# Patient Record
Sex: MALE | Race: WHITE | NOT HISPANIC OR LATINO | Employment: FULL TIME | URBAN - METROPOLITAN AREA
[De-identification: names, ages, dates, MRNs, and addresses within clinical notes are randomized per-mention and may not be internally consistent; named-entity substitution may affect disease eponyms.]

---

## 2022-07-21 ENCOUNTER — HOSPITAL ENCOUNTER (OUTPATIENT)
Facility: HOSPITAL | Age: 42
Discharge: HOME | End: 2022-07-22
Attending: EMERGENCY MEDICINE | Admitting: INTERNAL MEDICINE
Payer: COMMERCIAL

## 2022-07-21 DIAGNOSIS — K35.80 ACUTE APPENDICITIS: ICD-10-CM

## 2022-07-21 DIAGNOSIS — K35.30 ACUTE APPENDICITIS WITH LOCALIZED PERITONITIS, WITHOUT PERFORATION, ABSCESS, OR GANGRENE: ICD-10-CM

## 2022-07-21 LAB
ALBUMIN SERPL BCP-MCNC: 4.9 G/DL (ref 3.5–5.2)
ALP SERPL-CCNC: 80 U/L (ref 55–135)
ALT SERPL W/O P-5'-P-CCNC: 88 U/L (ref 10–44)
ANION GAP SERPL CALC-SCNC: 11 MMOL/L (ref 8–16)
AST SERPL-CCNC: 45 U/L (ref 10–40)
BACTERIA #/AREA URNS HPF: NEGATIVE /HPF
BASOPHILS # BLD AUTO: 0.02 K/UL (ref 0–0.2)
BASOPHILS NFR BLD: 0.2 % (ref 0–1.9)
BILIRUB SERPL-MCNC: 1.8 MG/DL (ref 0.1–1)
BILIRUB UR QL STRIP: NEGATIVE
BUN SERPL-MCNC: 15 MG/DL (ref 6–20)
CALCIUM SERPL-MCNC: 9.7 MG/DL (ref 8.7–10.5)
CHLORIDE SERPL-SCNC: 101 MMOL/L (ref 95–110)
CLARITY UR: CLEAR
CO2 SERPL-SCNC: 26 MMOL/L (ref 23–29)
COLOR UR: YELLOW
CREAT SERPL-MCNC: 1.3 MG/DL (ref 0.5–1.4)
CREAT SERPL-MCNC: 1.3 MG/DL (ref 0.5–1.4)
DIFFERENTIAL METHOD: ABNORMAL
EOSINOPHIL # BLD AUTO: 0 K/UL (ref 0–0.5)
EOSINOPHIL NFR BLD: 0.1 % (ref 0–8)
ERYTHROCYTE [DISTWIDTH] IN BLOOD BY AUTOMATED COUNT: 12.3 % (ref 11.5–14.5)
EST. GFR  (AFRICAN AMERICAN): >60 ML/MIN/1.73 M^2
EST. GFR  (NON AFRICAN AMERICAN): >60 ML/MIN/1.73 M^2
GLUCOSE SERPL-MCNC: 112 MG/DL (ref 70–110)
GLUCOSE UR QL STRIP: NEGATIVE
HCT VFR BLD AUTO: 46.7 % (ref 40–54)
HGB BLD-MCNC: 16.3 G/DL (ref 14–18)
HGB UR QL STRIP: NEGATIVE
HYALINE CASTS #/AREA URNS LPF: 0 /LPF
IMM GRANULOCYTES # BLD AUTO: 0.03 K/UL (ref 0–0.04)
IMM GRANULOCYTES NFR BLD AUTO: 0.3 % (ref 0–0.5)
KETONES UR QL STRIP: ABNORMAL
LEUKOCYTE ESTERASE UR QL STRIP: NEGATIVE
LIPASE SERPL-CCNC: 28 U/L (ref 4–60)
LYMPHOCYTES # BLD AUTO: 0.3 K/UL (ref 1–4.8)
LYMPHOCYTES NFR BLD: 3.6 % (ref 18–48)
MCH RBC QN AUTO: 31.8 PG (ref 27–31)
MCHC RBC AUTO-ENTMCNC: 34.9 G/DL (ref 32–36)
MCV RBC AUTO: 91 FL (ref 82–98)
MICROSCOPIC COMMENT: NORMAL
MONOCYTES # BLD AUTO: 0.2 K/UL (ref 0.3–1)
MONOCYTES NFR BLD: 1.8 % (ref 4–15)
NEUTROPHILS # BLD AUTO: 8.9 K/UL (ref 1.8–7.7)
NEUTROPHILS NFR BLD: 94 % (ref 38–73)
NITRITE UR QL STRIP: NEGATIVE
NRBC BLD-RTO: 0 /100 WBC
PH UR STRIP: >8 [PH] (ref 5–8)
PLATELET # BLD AUTO: 177 K/UL (ref 150–450)
PMV BLD AUTO: 9.2 FL (ref 9.2–12.9)
POTASSIUM SERPL-SCNC: 3.2 MMOL/L (ref 3.5–5.1)
PROT SERPL-MCNC: 8 G/DL (ref 6–8.4)
PROT UR QL STRIP: ABNORMAL
RBC # BLD AUTO: 5.12 M/UL (ref 4.6–6.2)
RBC #/AREA URNS HPF: 0 /HPF (ref 0–4)
SAMPLE: NORMAL
SARS-COV-2 RDRP RESP QL NAA+PROBE: NEGATIVE
SODIUM SERPL-SCNC: 138 MMOL/L (ref 136–145)
SP GR UR STRIP: >1.03 (ref 1–1.03)
SQUAMOUS #/AREA URNS HPF: 1 /HPF
URN SPEC COLLECT METH UR: ABNORMAL
UROBILINOGEN UR STRIP-ACNC: NEGATIVE EU/DL
WBC # BLD AUTO: 9.42 K/UL (ref 3.9–12.7)
WBC #/AREA URNS HPF: 1 /HPF (ref 0–5)

## 2022-07-21 PROCEDURE — 99285 EMERGENCY DEPT VISIT HI MDM: CPT | Mod: 25,CS

## 2022-07-21 PROCEDURE — 63600175 PHARM REV CODE 636 W HCPCS: Performed by: EMERGENCY MEDICINE

## 2022-07-21 PROCEDURE — 25000003 PHARM REV CODE 250: Performed by: EMERGENCY MEDICINE

## 2022-07-21 PROCEDURE — 96361 HYDRATE IV INFUSION ADD-ON: CPT | Mod: 59

## 2022-07-21 PROCEDURE — 81001 URINALYSIS AUTO W/SCOPE: CPT | Performed by: EMERGENCY MEDICINE

## 2022-07-21 PROCEDURE — 25500020 PHARM REV CODE 255: Performed by: EMERGENCY MEDICINE

## 2022-07-21 PROCEDURE — U0002 COVID-19 LAB TEST NON-CDC: HCPCS | Performed by: EMERGENCY MEDICINE

## 2022-07-21 PROCEDURE — 96375 TX/PRO/DX INJ NEW DRUG ADDON: CPT

## 2022-07-21 PROCEDURE — G0378 HOSPITAL OBSERVATION PER HR: HCPCS

## 2022-07-21 PROCEDURE — G0378 HOSPITAL OBSERVATION PER HR: HCPCS | Mod: CS

## 2022-07-21 PROCEDURE — 25000003 PHARM REV CODE 250: Performed by: NURSE PRACTITIONER

## 2022-07-21 PROCEDURE — 80053 COMPREHEN METABOLIC PANEL: CPT | Performed by: EMERGENCY MEDICINE

## 2022-07-21 PROCEDURE — 83690 ASSAY OF LIPASE: CPT | Performed by: EMERGENCY MEDICINE

## 2022-07-21 PROCEDURE — 96365 THER/PROPH/DIAG IV INF INIT: CPT

## 2022-07-21 PROCEDURE — 85025 COMPLETE CBC W/AUTO DIFF WBC: CPT | Performed by: EMERGENCY MEDICINE

## 2022-07-21 RX ORDER — POTASSIUM CHLORIDE 20 MEQ/1
40 TABLET, EXTENDED RELEASE ORAL ONCE
Status: COMPLETED | OUTPATIENT
Start: 2022-07-21 | End: 2022-07-21

## 2022-07-21 RX ORDER — SODIUM CHLORIDE 9 MG/ML
1000 INJECTION, SOLUTION INTRAVENOUS
Status: COMPLETED | OUTPATIENT
Start: 2022-07-21 | End: 2022-07-22

## 2022-07-21 RX ORDER — ACETAMINOPHEN 325 MG/1
650 TABLET ORAL EVERY 4 HOURS PRN
Status: DISCONTINUED | OUTPATIENT
Start: 2022-07-21 | End: 2022-07-22 | Stop reason: HOSPADM

## 2022-07-21 RX ORDER — KETOROLAC TROMETHAMINE 30 MG/ML
15 INJECTION, SOLUTION INTRAMUSCULAR; INTRAVENOUS
Status: COMPLETED | OUTPATIENT
Start: 2022-07-21 | End: 2022-07-21

## 2022-07-21 RX ADMIN — POTASSIUM CHLORIDE 40 MEQ: 1500 TABLET, EXTENDED RELEASE ORAL at 10:07

## 2022-07-21 RX ADMIN — PIPERACILLIN SODIUM,TAZOBACTAM SODIUM 3.38 G: 3; .375 INJECTION, POWDER, FOR SOLUTION INTRAVENOUS at 09:07

## 2022-07-21 RX ADMIN — SODIUM CHLORIDE 1000 ML: 0.9 INJECTION, SOLUTION INTRAVENOUS at 08:07

## 2022-07-21 RX ADMIN — KETOROLAC TROMETHAMINE 15 MG: 30 INJECTION, SOLUTION INTRAMUSCULAR at 07:07

## 2022-07-21 RX ADMIN — SODIUM CHLORIDE 1000 ML: 0.9 INJECTION, SOLUTION INTRAVENOUS at 07:07

## 2022-07-21 RX ADMIN — ACETAMINOPHEN 650 MG: 325 TABLET ORAL at 10:07

## 2022-07-21 RX ADMIN — IOHEXOL 100 ML: 350 INJECTION, SOLUTION INTRAVENOUS at 08:07

## 2022-07-22 ENCOUNTER — ANESTHESIA (OUTPATIENT)
Dept: SURGERY | Facility: HOSPITAL | Age: 42
End: 2022-07-22
Payer: COMMERCIAL

## 2022-07-22 ENCOUNTER — ANESTHESIA EVENT (OUTPATIENT)
Dept: SURGERY | Facility: HOSPITAL | Age: 42
End: 2022-07-22
Payer: COMMERCIAL

## 2022-07-22 VITALS
SYSTOLIC BLOOD PRESSURE: 129 MMHG | RESPIRATION RATE: 16 BRPM | WEIGHT: 265 LBS | DIASTOLIC BLOOD PRESSURE: 87 MMHG | HEIGHT: 76 IN | BODY MASS INDEX: 32.27 KG/M2 | OXYGEN SATURATION: 92 % | HEART RATE: 92 BPM | TEMPERATURE: 98 F

## 2022-07-22 LAB
ALBUMIN SERPL BCP-MCNC: 3.7 G/DL (ref 3.5–5.2)
ALP SERPL-CCNC: 69 U/L (ref 55–135)
ALT SERPL W/O P-5'-P-CCNC: 104 U/L (ref 10–44)
ANION GAP SERPL CALC-SCNC: 9 MMOL/L (ref 8–16)
AST SERPL-CCNC: 68 U/L (ref 10–40)
BASOPHILS # BLD AUTO: 0.03 K/UL (ref 0–0.2)
BASOPHILS NFR BLD: 0.4 % (ref 0–1.9)
BILIRUB SERPL-MCNC: 2 MG/DL (ref 0.1–1)
BUN SERPL-MCNC: 15 MG/DL (ref 6–20)
CALCIUM SERPL-MCNC: 8.5 MG/DL (ref 8.7–10.5)
CHLORIDE SERPL-SCNC: 102 MMOL/L (ref 95–110)
CO2 SERPL-SCNC: 24 MMOL/L (ref 23–29)
CREAT SERPL-MCNC: 1.2 MG/DL (ref 0.5–1.4)
DIFFERENTIAL METHOD: ABNORMAL
EOSINOPHIL # BLD AUTO: 0 K/UL (ref 0–0.5)
EOSINOPHIL NFR BLD: 0 % (ref 0–8)
ERYTHROCYTE [DISTWIDTH] IN BLOOD BY AUTOMATED COUNT: 12.4 % (ref 11.5–14.5)
EST. GFR  (AFRICAN AMERICAN): >60 ML/MIN/1.73 M^2
EST. GFR  (NON AFRICAN AMERICAN): >60 ML/MIN/1.73 M^2
GLUCOSE SERPL-MCNC: 124 MG/DL (ref 70–110)
HCT VFR BLD AUTO: 40 % (ref 40–54)
HGB BLD-MCNC: 13.5 G/DL (ref 14–18)
IMM GRANULOCYTES # BLD AUTO: 0.03 K/UL (ref 0–0.04)
IMM GRANULOCYTES NFR BLD AUTO: 0.4 % (ref 0–0.5)
LYMPHOCYTES # BLD AUTO: 0.2 K/UL (ref 1–4.8)
LYMPHOCYTES NFR BLD: 3.1 % (ref 18–48)
MCH RBC QN AUTO: 31.3 PG (ref 27–31)
MCHC RBC AUTO-ENTMCNC: 33.8 G/DL (ref 32–36)
MCV RBC AUTO: 93 FL (ref 82–98)
MONOCYTES # BLD AUTO: 0.3 K/UL (ref 0.3–1)
MONOCYTES NFR BLD: 4.7 % (ref 4–15)
NEUTROPHILS # BLD AUTO: 6.2 K/UL (ref 1.8–7.7)
NEUTROPHILS NFR BLD: 91.4 % (ref 38–73)
NRBC BLD-RTO: 0 /100 WBC
PLATELET # BLD AUTO: 150 K/UL (ref 150–450)
PMV BLD AUTO: 9.3 FL (ref 9.2–12.9)
POTASSIUM SERPL-SCNC: 3.7 MMOL/L (ref 3.5–5.1)
PROT SERPL-MCNC: 6.6 G/DL (ref 6–8.4)
RBC # BLD AUTO: 4.32 M/UL (ref 4.6–6.2)
SODIUM SERPL-SCNC: 135 MMOL/L (ref 136–145)
WBC # BLD AUTO: 6.8 K/UL (ref 3.9–12.7)

## 2022-07-22 PROCEDURE — 85025 COMPLETE CBC W/AUTO DIFF WBC: CPT | Performed by: NURSE PRACTITIONER

## 2022-07-22 PROCEDURE — 36000709 HC OR TIME LEV III EA ADD 15 MIN: Performed by: STUDENT IN AN ORGANIZED HEALTH CARE EDUCATION/TRAINING PROGRAM

## 2022-07-22 PROCEDURE — 96376 TX/PRO/DX INJ SAME DRUG ADON: CPT

## 2022-07-22 PROCEDURE — 99204 OFFICE O/P NEW MOD 45 MIN: CPT | Mod: 57,,, | Performed by: STUDENT IN AN ORGANIZED HEALTH CARE EDUCATION/TRAINING PROGRAM

## 2022-07-22 PROCEDURE — 36000708 HC OR TIME LEV III 1ST 15 MIN: Performed by: STUDENT IN AN ORGANIZED HEALTH CARE EDUCATION/TRAINING PROGRAM

## 2022-07-22 PROCEDURE — 37000008 HC ANESTHESIA 1ST 15 MINUTES: Performed by: STUDENT IN AN ORGANIZED HEALTH CARE EDUCATION/TRAINING PROGRAM

## 2022-07-22 PROCEDURE — 44970 PR LAP,APPENDECTOMY: ICD-10-PCS | Mod: ,,, | Performed by: STUDENT IN AN ORGANIZED HEALTH CARE EDUCATION/TRAINING PROGRAM

## 2022-07-22 PROCEDURE — 63600175 PHARM REV CODE 636 W HCPCS: Performed by: NURSE ANESTHETIST, CERTIFIED REGISTERED

## 2022-07-22 PROCEDURE — 25000003 PHARM REV CODE 250: Performed by: STUDENT IN AN ORGANIZED HEALTH CARE EDUCATION/TRAINING PROGRAM

## 2022-07-22 PROCEDURE — 37000009 HC ANESTHESIA EA ADD 15 MINS: Performed by: STUDENT IN AN ORGANIZED HEALTH CARE EDUCATION/TRAINING PROGRAM

## 2022-07-22 PROCEDURE — 36415 COLL VENOUS BLD VENIPUNCTURE: CPT | Performed by: NURSE PRACTITIONER

## 2022-07-22 PROCEDURE — G0378 HOSPITAL OBSERVATION PER HR: HCPCS | Mod: CS

## 2022-07-22 PROCEDURE — 99204 PR OFFICE/OUTPT VISIT, NEW, LEVL IV, 45-59 MIN: ICD-10-PCS | Mod: 57,,, | Performed by: STUDENT IN AN ORGANIZED HEALTH CARE EDUCATION/TRAINING PROGRAM

## 2022-07-22 PROCEDURE — 25000003 PHARM REV CODE 250: Performed by: NURSE ANESTHETIST, CERTIFIED REGISTERED

## 2022-07-22 PROCEDURE — 71000033 HC RECOVERY, INTIAL HOUR: Performed by: STUDENT IN AN ORGANIZED HEALTH CARE EDUCATION/TRAINING PROGRAM

## 2022-07-22 PROCEDURE — 63600175 PHARM REV CODE 636 W HCPCS: Performed by: NURSE PRACTITIONER

## 2022-07-22 PROCEDURE — 96366 THER/PROPH/DIAG IV INF ADDON: CPT

## 2022-07-22 PROCEDURE — 80053 COMPREHEN METABOLIC PANEL: CPT | Performed by: NURSE PRACTITIONER

## 2022-07-22 PROCEDURE — 27201423 OPTIME MED/SURG SUP & DEVICES STERILE SUPPLY: Performed by: STUDENT IN AN ORGANIZED HEALTH CARE EDUCATION/TRAINING PROGRAM

## 2022-07-22 PROCEDURE — 25000003 PHARM REV CODE 250: Performed by: NURSE PRACTITIONER

## 2022-07-22 PROCEDURE — 71000039 HC RECOVERY, EACH ADD'L HOUR: Performed by: STUDENT IN AN ORGANIZED HEALTH CARE EDUCATION/TRAINING PROGRAM

## 2022-07-22 PROCEDURE — 44970 LAPAROSCOPY APPENDECTOMY: CPT | Mod: ,,, | Performed by: STUDENT IN AN ORGANIZED HEALTH CARE EDUCATION/TRAINING PROGRAM

## 2022-07-22 PROCEDURE — 96361 HYDRATE IV INFUSION ADD-ON: CPT

## 2022-07-22 RX ORDER — FENTANYL CITRATE 50 UG/ML
INJECTION, SOLUTION INTRAMUSCULAR; INTRAVENOUS
Status: DISCONTINUED | OUTPATIENT
Start: 2022-07-22 | End: 2022-07-22

## 2022-07-22 RX ORDER — ACETAMINOPHEN 10 MG/ML
INJECTION, SOLUTION INTRAVENOUS
Status: DISCONTINUED | OUTPATIENT
Start: 2022-07-22 | End: 2022-07-22

## 2022-07-22 RX ORDER — SODIUM CHLORIDE 0.9 G/100ML
IRRIGANT IRRIGATION
Status: DISCONTINUED | OUTPATIENT
Start: 2022-07-22 | End: 2022-07-22 | Stop reason: HOSPADM

## 2022-07-22 RX ORDER — PROPOFOL 10 MG/ML
VIAL (ML) INTRAVENOUS
Status: DISCONTINUED | OUTPATIENT
Start: 2022-07-22 | End: 2022-07-22

## 2022-07-22 RX ORDER — SODIUM CHLORIDE, SODIUM LACTATE, POTASSIUM CHLORIDE, CALCIUM CHLORIDE 600; 310; 30; 20 MG/100ML; MG/100ML; MG/100ML; MG/100ML
INJECTION, SOLUTION INTRAVENOUS CONTINUOUS PRN
Status: DISCONTINUED | OUTPATIENT
Start: 2022-07-22 | End: 2022-07-22

## 2022-07-22 RX ORDER — OXYCODONE AND ACETAMINOPHEN 5; 325 MG/1; MG/1
2 TABLET ORAL EVERY 4 HOURS PRN
Status: CANCELLED | OUTPATIENT
Start: 2022-07-22

## 2022-07-22 RX ORDER — MEPERIDINE HYDROCHLORIDE 50 MG/ML
12.5 INJECTION INTRAMUSCULAR; INTRAVENOUS; SUBCUTANEOUS EVERY 10 MIN PRN
Status: DISCONTINUED | OUTPATIENT
Start: 2022-07-22 | End: 2022-07-22 | Stop reason: HOSPADM

## 2022-07-22 RX ORDER — LANOLIN ALCOHOL/MO/W.PET/CERES
800 CREAM (GRAM) TOPICAL
Status: DISCONTINUED | OUTPATIENT
Start: 2022-07-22 | End: 2022-07-22 | Stop reason: HOSPADM

## 2022-07-22 RX ORDER — SODIUM CHLORIDE 9 MG/ML
INJECTION, SOLUTION INTRAVENOUS CONTINUOUS
Status: DISCONTINUED | OUTPATIENT
Start: 2022-07-22 | End: 2022-07-22 | Stop reason: HOSPADM

## 2022-07-22 RX ORDER — SODIUM CHLORIDE 0.9 % (FLUSH) 0.9 %
10 SYRINGE (ML) INJECTION
Status: DISCONTINUED | OUTPATIENT
Start: 2022-07-22 | End: 2022-07-22 | Stop reason: HOSPADM

## 2022-07-22 RX ORDER — LIDOCAINE HYDROCHLORIDE 10 MG/ML
INJECTION, SOLUTION EPIDURAL; INFILTRATION; INTRACAUDAL; PERINEURAL
Status: DISCONTINUED | OUTPATIENT
Start: 2022-07-22 | End: 2022-07-22

## 2022-07-22 RX ORDER — ONDANSETRON 2 MG/ML
INJECTION INTRAMUSCULAR; INTRAVENOUS
Status: DISCONTINUED | OUTPATIENT
Start: 2022-07-22 | End: 2022-07-22

## 2022-07-22 RX ORDER — ROCURONIUM BROMIDE 10 MG/ML
INJECTION, SOLUTION INTRAVENOUS
Status: DISCONTINUED | OUTPATIENT
Start: 2022-07-22 | End: 2022-07-22

## 2022-07-22 RX ORDER — HYDROMORPHONE HYDROCHLORIDE 1 MG/ML
0.2 INJECTION, SOLUTION INTRAMUSCULAR; INTRAVENOUS; SUBCUTANEOUS EVERY 5 MIN PRN
Status: DISCONTINUED | OUTPATIENT
Start: 2022-07-22 | End: 2022-07-22 | Stop reason: HOSPADM

## 2022-07-22 RX ORDER — OXYCODONE AND ACETAMINOPHEN 5; 325 MG/1; MG/1
1 TABLET ORAL EVERY 4 HOURS PRN
Status: CANCELLED | OUTPATIENT
Start: 2022-07-22

## 2022-07-22 RX ORDER — NALOXONE HCL 0.4 MG/ML
0.02 VIAL (ML) INJECTION
Status: DISCONTINUED | OUTPATIENT
Start: 2022-07-22 | End: 2022-07-22 | Stop reason: HOSPADM

## 2022-07-22 RX ORDER — ONDANSETRON 2 MG/ML
4 INJECTION INTRAMUSCULAR; INTRAVENOUS EVERY 6 HOURS PRN
Status: DISCONTINUED | OUTPATIENT
Start: 2022-07-22 | End: 2022-07-22 | Stop reason: HOSPADM

## 2022-07-22 RX ORDER — FAMOTIDINE 10 MG/ML
INJECTION INTRAVENOUS
Status: DISCONTINUED | OUTPATIENT
Start: 2022-07-22 | End: 2022-07-22

## 2022-07-22 RX ORDER — KETOROLAC TROMETHAMINE 30 MG/ML
15 INJECTION, SOLUTION INTRAMUSCULAR; INTRAVENOUS EVERY 6 HOURS PRN
Status: DISCONTINUED | OUTPATIENT
Start: 2022-07-22 | End: 2022-07-22 | Stop reason: HOSPADM

## 2022-07-22 RX ORDER — TRAMADOL HYDROCHLORIDE 50 MG/1
50 TABLET ORAL EVERY 6 HOURS PRN
Qty: 8 TABLET | Refills: 0 | Status: SHIPPED | OUTPATIENT
Start: 2022-07-22 | End: 2022-07-24

## 2022-07-22 RX ORDER — ONDANSETRON 2 MG/ML
4 INJECTION INTRAMUSCULAR; INTRAVENOUS DAILY PRN
Status: DISCONTINUED | OUTPATIENT
Start: 2022-07-22 | End: 2022-07-22 | Stop reason: HOSPADM

## 2022-07-22 RX ORDER — OXYCODONE HYDROCHLORIDE 5 MG/1
5 TABLET ORAL
Status: DISCONTINUED | OUTPATIENT
Start: 2022-07-22 | End: 2022-07-22 | Stop reason: HOSPADM

## 2022-07-22 RX ORDER — BUPIVACAINE HYDROCHLORIDE AND EPINEPHRINE 2.5; 5 MG/ML; UG/ML
INJECTION, SOLUTION EPIDURAL; INFILTRATION; INTRACAUDAL; PERINEURAL
Status: DISCONTINUED | OUTPATIENT
Start: 2022-07-22 | End: 2022-07-22 | Stop reason: HOSPADM

## 2022-07-22 RX ORDER — AMOXICILLIN 250 MG
1 CAPSULE ORAL 2 TIMES DAILY PRN
Status: DISCONTINUED | OUTPATIENT
Start: 2022-07-22 | End: 2022-07-22 | Stop reason: HOSPADM

## 2022-07-22 RX ORDER — MORPHINE SULFATE 4 MG/ML
4 INJECTION, SOLUTION INTRAMUSCULAR; INTRAVENOUS EVERY 4 HOURS PRN
Status: DISCONTINUED | OUTPATIENT
Start: 2022-07-22 | End: 2022-07-22 | Stop reason: HOSPADM

## 2022-07-22 RX ORDER — DIPHENHYDRAMINE HYDROCHLORIDE 50 MG/ML
12.5 INJECTION INTRAMUSCULAR; INTRAVENOUS
Status: DISCONTINUED | OUTPATIENT
Start: 2022-07-22 | End: 2022-07-22 | Stop reason: HOSPADM

## 2022-07-22 RX ORDER — SUCCINYLCHOLINE CHLORIDE 20 MG/ML
INJECTION INTRAMUSCULAR; INTRAVENOUS
Status: DISCONTINUED | OUTPATIENT
Start: 2022-07-22 | End: 2022-07-22

## 2022-07-22 RX ORDER — MIDAZOLAM HYDROCHLORIDE 1 MG/ML
INJECTION INTRAMUSCULAR; INTRAVENOUS
Status: DISCONTINUED | OUTPATIENT
Start: 2022-07-22 | End: 2022-07-22

## 2022-07-22 RX ADMIN — SODIUM CHLORIDE: 0.9 INJECTION, SOLUTION INTRAVENOUS at 02:07

## 2022-07-22 RX ADMIN — ROCURONIUM BROMIDE 5 MG: 10 INJECTION, SOLUTION INTRAVENOUS at 09:07

## 2022-07-22 RX ADMIN — SUCCINYLCHOLINE CHLORIDE 120 MG: 20 INJECTION, SOLUTION INTRAMUSCULAR; INTRAVENOUS at 09:07

## 2022-07-22 RX ADMIN — FAMOTIDINE 20 MG: 10 INJECTION, SOLUTION INTRAVENOUS at 09:07

## 2022-07-22 RX ADMIN — KETOROLAC TROMETHAMINE 15 MG: 30 INJECTION, SOLUTION INTRAMUSCULAR; INTRAVENOUS at 03:07

## 2022-07-22 RX ADMIN — ROCURONIUM BROMIDE 40 MG: 10 INJECTION, SOLUTION INTRAVENOUS at 09:07

## 2022-07-22 RX ADMIN — ONDANSETRON 4 MG: 2 INJECTION INTRAMUSCULAR; INTRAVENOUS at 09:07

## 2022-07-22 RX ADMIN — PROPOFOL 180 MG: 10 INJECTION, EMULSION INTRAVENOUS at 09:07

## 2022-07-22 RX ADMIN — SUGAMMADEX 200 MG: 100 INJECTION, SOLUTION INTRAVENOUS at 10:07

## 2022-07-22 RX ADMIN — FENTANYL CITRATE 100 MCG: 50 INJECTION INTRAMUSCULAR; INTRAVENOUS at 09:07

## 2022-07-22 RX ADMIN — SODIUM CHLORIDE, SODIUM LACTATE, POTASSIUM CHLORIDE, AND CALCIUM CHLORIDE: .6; .31; .03; .02 INJECTION, SOLUTION INTRAVENOUS at 09:07

## 2022-07-22 RX ADMIN — LIDOCAINE HYDROCHLORIDE 50 MG: 10 INJECTION, SOLUTION EPIDURAL; INFILTRATION; INTRACAUDAL; PERINEURAL at 09:07

## 2022-07-22 RX ADMIN — PIPERACILLIN SODIUM,TAZOBACTAM SODIUM 3.38 G: 3; .375 INJECTION, POWDER, FOR SOLUTION INTRAVENOUS at 04:07

## 2022-07-22 RX ADMIN — ACETAMINOPHEN 1000 MG: 10 INJECTION, SOLUTION INTRAVENOUS at 09:07

## 2022-07-22 RX ADMIN — MIDAZOLAM HYDROCHLORIDE 2 MG: 1 INJECTION, SOLUTION INTRAMUSCULAR; INTRAVENOUS at 09:07

## 2022-07-22 NOTE — ED PROVIDER NOTES
Encounter Date: 7/21/2022       History     Chief Complaint   Patient presents with    Abdominal Pain     RT SIDED X 1 DAY    Nausea    Chills    Fever     Patient started with nonspecific abdominal pain with nausea last night.  Today patient has more right lower quadrant abdominal pain associated with nausea and subjective fever and chills.  No similar symptoms in the past.  Patient was out of town and went to urgent care.  He was told to go to the emergency department.  He drove from [place] to here.  He reports increased pain with bumps in the road.        Review of patient's allergies indicates:  Not on File  No past medical history on file.  No past surgical history on file.  No family history on file.     Review of Systems   Constitutional: Positive for chills and fever.   HENT: Negative for sore throat.    Eyes: Negative for photophobia and visual disturbance.   Respiratory: Negative for shortness of breath.    Cardiovascular: Negative for chest pain.   Gastrointestinal: Positive for abdominal pain and nausea. Negative for diarrhea.   Genitourinary: Negative for dysuria.   Musculoskeletal: Negative for joint swelling.   Skin: Negative for rash.   Neurological: Negative for weakness and headaches.   Psychiatric/Behavioral: Negative for confusion.       Physical Exam     Initial Vitals [07/21/22 1842]   BP Pulse Resp Temp SpO2   (!) 145/98 (!) 112 18 99.8 °F (37.7 °C) 99 %      MAP       --         Physical Exam    Nursing note and vitals reviewed.  Constitutional: He is not diaphoretic. No distress.   HENT:   Head: Normocephalic and atraumatic.   Eyes: Conjunctivae are normal.   Neck:   Normal range of motion.  Cardiovascular: Regular rhythm.   Pulmonary/Chest: Breath sounds normal.   Abdominal: Abdomen is soft. Bowel sounds are normal.   Mild right lower quadrant abdominal tenderness no guarding or rebound   Musculoskeletal:         General: Normal range of motion.      Cervical back: Normal range of  motion.     Neurological: He is alert and oriented to person, place, and time. He has normal strength. No cranial nerve deficit or sensory deficit.   Skin: No rash noted.   Psychiatric: He has a normal mood and affect.         ED Course   Procedures  Labs Reviewed   CBC W/ AUTO DIFFERENTIAL - Abnormal; Notable for the following components:       Result Value    MCH 31.8 (*)     Gran # (ANC) 8.9 (*)     Lymph # 0.3 (*)     Mono # 0.2 (*)     Gran % 94.0 (*)     Lymph % 3.6 (*)     Mono % 1.8 (*)     All other components within normal limits   COMPREHENSIVE METABOLIC PANEL - Abnormal; Notable for the following components:    Potassium 3.2 (*)     Glucose 112 (*)     Total Bilirubin 1.8 (*)     AST 45 (*)     ALT 88 (*)     All other components within normal limits   URINALYSIS, REFLEX TO URINE CULTURE - Abnormal; Notable for the following components:    pH, UA >8.0 (*)     Specific Gravity, UA >1.030 (*)     Protein, UA 1+ (*)     Ketones, UA Trace (*)     All other components within normal limits    Narrative:     Specimen Source->Urine   LIPASE   SARS-COV-2 RNA AMPLIFICATION, QUAL   URINALYSIS MICROSCOPIC    Narrative:     Specimen Source->Urine   COMPREHENSIVE METABOLIC PANEL   ISTAT CREATININE   POCT CREATININE          Imaging Results          CT Abdomen Pelvis With Contrast (Final result)  Result time 07/21/22 20:47:08    Final result by Ed Sky MD (07/21/22 20:47:08)                 Narrative:    EXAM DESCRIPTION:  CT ABDOMEN PELVIS WITH CONTRAST  Rad[facility]: CT ABDOMEN PELVIS WITH IV CONTRAST    CLINICAL HISTORY:  42 years  Male;  Abdominal abscess/infection suspected    TECHNOLOGIST NOTES:    TECHNIQUE: Helical CT imaging was obtained of the abdomen and pelvis with multiplanar reconstructions. This exam was performed according to our departmental dose-optimization program, which includes automated exposure control, adjustment of the mA and/or kV according to patient size and/or use of iterative reconstruction  techniques.    COMPARISON: None currently available    FINDINGS:  Abdomen:  No evidence of acute disease in the visualized lung bases.  The liver, spleen, pancreas, adrenal glands and kidneys show no acute abnormality. No evidence of acute pancreatitis.    There are no calcified gallstones. There is no gallbladder wall thickening. No pericholecystic fluid.  There is no gross biliary duct dilatation.  No significant hydronephrosis bilaterally.  Small cortical cyst in the lower pole of the right kidney. There is a very small fat-containing umbilical hernia.    No free air.    There is no significant free fluid.    There is no significant aneurysmal enlargement of the abdominal aorta.    Pelvis:  There is no evidence of bowel obstruction.    No herniated bowel loops.  . No focal inflammatory changes . The distal appendix is 9-10 mm thick and appears to be inflamed. Early appendicitis possible..  Evaluation of the bowel is limited when there is no oral contrast or when the bowel is incompletely opacified with enteric contrast.. There is no significant free fluid in the pelvis. The bladder is grossly unremarkable.        IMPRESSION:  1.  Early appendicitis involving the distal appendix suspected. No free air or abscess.  2.  No bowel obstruction. No herniated bowel loops..    Electronically signed by:  Ed Sky MD  7/21/2022 8:47 PM CDT Workstation: [id number]                               Medications   acetaminophen tablet 650 mg (650 mg Oral Given 7/21/22 2245)   piperacillin-tazobactam 3.375 g in dextrose 5 % 50 mL IVPB (ready to mix system) (has no administration in time range)   sodium chloride 0.9% bolus 1,000 mL (0 mLs Intravenous Stopped 7/21/22 2012)   ketorolac injection 15 mg (15 mg Intravenous Given 7/21/22 1917)   iohexoL (OMNIPAQUE 350) injection 100 mL (100 mLs Intravenous Given 7/21/22 2007)   0.9%  NaCl infusion (1,000 mLs Intravenous New Bag 7/21/22 2037)   piperacillin-tazobactam 3.375 g in dextrose 5 %  50 mL IVPB (ready to mix system) (0 g Intravenous Stopped 7/21/22 2206)   potassium chloride SA CR tablet 40 mEq (40 mEq Oral Given 7/21/22 2219)     Medical Decision Making:   History:   Old Medical Records: I decided to obtain old medical records.  Clinical Tests:   Lab Tests: Reviewed  Radiological Study: Reviewed  Medical Tests: Reviewed  ED Management:  Patient presents with right lower quadrant abdominal pain.  Exam consistent with acute appendicitis.  CT does have acute appendicitis.  Discussed with Dr. Marcie verde with general surgery.  He will perform appendectomy this morning.  Hospitalist consulted for admission.  Broad-spectrum antibiotics initiated.                      Clinical Impression:   Final diagnoses:  [K35.80] Acute appendicitis          ED Disposition Condition    Observation               Adeel Hobbs MD  07/22/22 012

## 2022-07-22 NOTE — PLAN OF CARE
[facility]  Initial Discharge Assessment       Primary Care Provider: Primary Doctor No    Admission Diagnosis: Acute appendicitis [K35.80]    Admission Date: 7/21/2022  Expected Discharge Date: 7/22/2022    Discharge Barriers Identified: None     Initial assessment at bedside with patient. Patient is anticipating discharge today with no needs.    Payor: HUMANA / Plan: HUMANA  PPO / Product Type: PPO /     Extended Emergency Contact Information  Primary Emergency Contact: Kalen Mckeon  Mobile Phone: [phone]  Relation: Law Enforcement  Preferred language: English   needed? No    Discharge Plan A: Home  Discharge Plan B: Home      [id number] [place] - [address]  [address]  [place] [address]  Phone: [phone] Fax: [phone]      Initial Assessment (most recent)     Adult Discharge Assessment - 07/22/22 1446        Discharge Assessment    Assessment Type Discharge Planning Assessment     Confirmed/corrected address, phone number and insurance Yes     Confirmed Demographics Correct on Facesheet     Source of Information patient     Reason For Admission Appendicitis     Lives With alone     Facility Arrived From: home     Do you expect to return to your current living situation? Yes     Do you have help at home or someone to help you manage your care at home? No     Prior to hospitilization cognitive status: Alert/Oriented     Current cognitive status: Alert/Oriented     Walking or Climbing Stairs Difficulty none     Dressing/Bathing Difficulty none     Equipment Currently Used at Home none     Readmission within 30 days? No     Patient currently being followed by outpatient case management? No     Do you currently have service(s) that help you manage your care at home? No     Do you take prescription medications? No     Do you have prescription coverage? Yes     Coverage [employer]     Do you have any problems affording any of your prescribed medications? No      Who is going to help you get home at discharge? Kalen Mckeon [phone] (friend)     How do you get to doctors appointments? car, drives self     Are you on dialysis? No     Do you take coumadin? No     Discharge Plan A Home     Discharge Plan B Home     DME Needed Upon Discharge  none     Discharge Plan discussed with: Patient     Discharge Barriers Identified None

## 2022-07-22 NOTE — ANESTHESIA PREPROCEDURE EVALUATION
07/22/2022  Davin Shi is a 42 y.o., male.      Pre-op Assessment    I have reviewed the Patient Summary Reports.     I have reviewed the Nursing Notes. I have reviewed the NPO Status.   I have reviewed the Medications.     Review of Systems  Anesthesia Hx:  No problems with previous Anesthesia Denies Hx of Anesthetic complications  Neg history of prior surgery. Denies Family Hx of Anesthesia complications.   Denies Personal Hx of Anesthesia complications.   Hematology/Oncology:  Hematology Normal   Oncology Normal     EENT/Dental:EENT/Dental Normal   Cardiovascular:  Cardiovascular Normal     Pulmonary:  Pulmonary Normal    Renal/:  Renal/ Normal     Hepatic/GI:  Hepatic/GI Normal    Musculoskeletal:  Musculoskeletal Normal    Neurological:  Neurology Normal    Endocrine:  Endocrine Normal    Dermatological:  Skin Normal    Psych:  Psychiatric Normal           Patient Active Problem List   Diagnosis    Acute appendicitis       No past surgical history on file.     Tobacco Use:  The patient  has no history on file for tobacco use.     No results found for this or any previous visit.     Imaging Results          CT Abdomen Pelvis With Contrast (Edited Result - FINAL)  Result time 07/22/22 03:02:58    Edited Result - FINAL by Ed Sky MD (07/22/22 03:02:58)                 Narrative:         ADDENDUM #1       Addendum:  Results were communicated with and acknowledged by Dr. Hobbs on 7/21/2022 at 8:55 PM central time    Electronically signed by:  Ed Sky MD  7/22/2022 3:02 AM CDT Workstation: [id number]     ORIGINAL REPORT       EXAM DESCRIPTION:  CT ABDOMEN PELVIS WITH CONTRAST  Rad[facility]: CT ABDOMEN PELVIS WITH IV CONTRAST    CLINICAL HISTORY:  42 years  Male;  Abdominal abscess/infection suspected    TECHNOLOGIST NOTES:    TECHNIQUE: Helical CT imaging was obtained of the abdomen and pelvis with  multiplanar reconstructions. This exam was performed according to our departmental dose-optimization program, which includes automated exposure control, adjustment of the mA and/or kV according to patient size and/or use of iterative reconstruction techniques.    COMPARISON: None currently available    FINDINGS:  Abdomen:  No evidence of acute disease in the visualized lung bases.  The liver, spleen, pancreas, adrenal glands and kidneys show no acute abnormality. No evidence of acute pancreatitis.    There are no calcified gallstones. There is no gallbladder wall thickening. No pericholecystic fluid.  There is no gross biliary duct dilatation.  No significant hydronephrosis bilaterally.  Small cortical cyst in the lower pole of the right kidney. There is a very small fat-containing umbilical hernia.    No free air.    There is no significant free fluid.    There is no significant aneurysmal enlargement of the abdominal aorta.    Pelvis:  There is no evidence of bowel obstruction.    No herniated bowel loops.  . No focal inflammatory changes . The distal appendix is 9-10 mm thick and appears to be inflamed. Early appendicitis possible..  Evaluation of the bowel is limited when there is no oral contrast or when the bowel is incompletely opacified with enteric contrast.. There is no significant free fluid in the pelvis. The bladder is grossly unremarkable.        IMPRESSION:  1.  Early appendicitis involving the distal appendix suspected. No free air or abscess.  2.  No bowel obstruction. No herniated bowel loops..    Electronically signed by:  Ed Sky MD  7/21/2022 8:47 PM CDT Workstation: [id number]                  Final result by Ed Sky MD (07/21/22 20:47:08)                 Narrative:    EXAM DESCRIPTION:  CT ABDOMEN PELVIS WITH CONTRAST  RadLex: CT ABDOMEN PELVIS WITH IV CONTRAST    CLINICAL HISTORY:  42 years  Male;  Abdominal abscess/infection suspected    TECHNOLOGIST NOTES:    TECHNIQUE: Helical CT  imaging was obtained of the abdomen and pelvis with multiplanar reconstructions. This exam was performed according to our departmental dose-optimization program, which includes automated exposure control, adjustment of the mA and/or kV according to patient size and/or use of iterative reconstruction techniques.    COMPARISON: None currently available    FINDINGS:  Abdomen:  No evidence of acute disease in the visualized lung bases.  The liver, spleen, pancreas, adrenal glands and kidneys show no acute abnormality. No evidence of acute pancreatitis.    There are no calcified gallstones. There is no gallbladder wall thickening. No pericholecystic fluid.  There is no gross biliary duct dilatation.  No significant hydronephrosis bilaterally.  Small cortical cyst in the lower pole of the right kidney. There is a very small fat-containing umbilical hernia.    No free air.    There is no significant free fluid.    There is no significant aneurysmal enlargement of the abdominal aorta.    Pelvis:  There is no evidence of bowel obstruction.    No herniated bowel loops.  . No focal inflammatory changes . The distal appendix is 9-10 mm thick and appears to be inflamed. Early appendicitis possible..  Evaluation of the bowel is limited when there is no oral contrast or when the bowel is incompletely opacified with enteric contrast.. There is no significant free fluid in the pelvis. The bladder is grossly unremarkable.        IMPRESSION:  1.  Early appendicitis involving the distal appendix suspected. No free air or abscess.  2.  No bowel obstruction. No herniated bowel loops..    Electronically signed by:  Ed Sky MD  7/21/2022 8:47 PM CDT Workstation: [id number]                               Lab Results   Component Value Date    WBC 6.80 07/22/2022    HGB 13.5 (L) 07/22/2022    HCT 40.0 07/22/2022    MCV 93 07/22/2022     07/22/2022     BMP  Lab Results   Component Value Date     (L) 07/22/2022    K 3.7 07/22/2022      07/22/2022    CO2 24 07/22/2022    BUN 15 07/22/2022    CREATININE 1.2 07/22/2022    CALCIUM 8.5 (L) 07/22/2022    ANIONGAP 9 07/22/2022     (H) 07/22/2022     (H) 07/21/2022       No results found for this or any previous visit.          Physical Exam  General: Well nourished and Alert    Airway:  Mallampati: II   Mouth Opening: Normal  TM Distance: Normal  Tongue: Normal  Neck ROM: Normal ROM    Dental:  Intact    Chest/Lungs:  Clear to auscultation, Normal Respiratory Rate    Heart:  Rate: Normal  Rhythm: Regular Rhythm  Sounds: Normal        Anesthesia Plan  Type of Anesthesia, risks & benefits discussed:    Anesthesia Type: Gen ETT  Intra-op Monitoring Plan: Standard ASA Monitors  Post Op Pain Control Plan: multimodal analgesia  Induction:  IV  Informed Consent: Patient consented to blood products? Yes  ASA Score: 2  Anesthesia Plan Notes: Tylenol 1 gm IV  Zofran, Pepcid  GETA RSI    Ready For Surgery From Anesthesia Perspective.     .

## 2022-07-22 NOTE — H&P
[facility] History & Physical Examination   Patient Name: Davin Shi  MRN: [id number]  Patient Class: OP- Observation   Admission Date: 7/21/2022  6:40 PM  Length of Stay: 0  Attending Physician: Dr. Collins  Primary Care Provider: Primary Doctor No  Face-to-Face encounter date: 07/21/2022  Code Status: full code  Chief Complaint: Abdominal Pain (RT SIDED X 1 DAY), Nausea, Chills, and Fever          Patient information was obtained from patient, past medical records and ER records.   HISTORY OF PRESENT ILLNESS:   History was obtained from the patient and ER physician Sign-out. Patient presents to the ED with the complaint of abdominal pain with associated nausea that began yesterday evening. Today became worse and more localized to RLQ. He reports subjective fevers and chills. He was out of town in [place] and went to  there. He was told to go to the ED, so he drove back home to the ED to be evaluated. He reports increase pain with hitting bumps in the road.     Patient denies chest pain, sob, hematemesis, dysuria, hematuria, diarrhea, melena, numbness, tingling or LOC.     In ED patient with Temp 99.8. He is tachycardiac. Lab work is unremarkable with exception of mild hypokalemia and transaminitis.  CT findings consistent with acute appendicitis.       REVIEW OF SYSTEMS:   10 Point Review of System was performed and was found to be negative except for that mentioned already in the HPI above.     PAST MEDICAL HISTORY:   Reviewed. No pertinent past medical history.     PAST SURGICAL HISTORY:     Ankle surgery at age 22.    ALLERGIES:   Patient has no allergy information on record.    FAMILY HISTORY:     Reviewed. Family history not pertinent.     SOCIAL HISTORY:     Social History     Tobacco Use    Smoking status: Not on file    Smokeless tobacco: Not on file   Substance Use Topics    Alcohol use: Not on file        Social History     Substance and Sexual Activity   Sexual  "Activity Not on file        HOME MEDICATIONS:     Prior to Admission medications    Not on File         PHYSICAL EXAM:   /80   Pulse 103   Temp 99.8 °F (37.7 °C) (Oral)   Resp 18   Ht 6' 4" (1.93 m)   Wt 120.2 kg (265 lb)   SpO2 97%   BMI 32.26 kg/m²   Vitals Reviewed  General appearance: Well-developed, well-nourished, White male in no apparent distress.  Skin: No Rash. Warm, dry.   Neuro: AAOx3. Follows commands. Motor and sensory exams grossly intact. Good tone. Power in all 4 extremities 5/5.   HENT: Atraumatic head. Moist mucous membranes of oral cavity.  Eyes: Normal extraocular movements. PERRLA  Neck: Supple. No evidence of lymphadenopathy. No thyroidomegaly.  Lungs: Clear to auscultation bilaterally. No wheezing present.   Heart: Regular rate and rhythm. S1 and S2 present with no murmurs/gallop/rub. No pedal edema. No JVD present.   Abdomen: Soft, non-distended, non-tender. No rebound tenderness. There is guarding. No masses or organomegaly. Bowel sounds are normal. Bladder is not palpable.   Extremities: No cyanosis, clubbing.  Psych/mental status: Alert and oriented. Cooperative. Responds appropriately to questions.   EMERGENCY DEPARTMENT LABS AND IMAGING:     Labs Reviewed   CBC W/ AUTO DIFFERENTIAL - Abnormal; Notable for the following components:       Result Value    MCH 31.8 (*)     Gran # (ANC) 8.9 (*)     Lymph # 0.3 (*)     Mono # 0.2 (*)     Gran % 94.0 (*)     Lymph % 3.6 (*)     Mono % 1.8 (*)     All other components within normal limits   COMPREHENSIVE METABOLIC PANEL - Abnormal; Notable for the following components:    Potassium 3.2 (*)     Glucose 112 (*)     Total Bilirubin 1.8 (*)     AST 45 (*)     ALT 88 (*)     All other components within normal limits   URINALYSIS, REFLEX TO URINE CULTURE - Abnormal; Notable for the following components:    pH, UA >8.0 (*)     Specific Gravity, UA >1.030 (*)     Protein, UA 1+ (*)     Ketones, UA Trace (*)     All other components within " normal limits    Narrative:     Specimen Source->Urine   LIPASE   SARS-COV-2 RNA AMPLIFICATION, QUAL   URINALYSIS MICROSCOPIC    Narrative:     Specimen Source->Urine   ISTAT CREATININE   POCT CREATININE       CT Abdomen Pelvis With Contrast   Final Result          ASSESSMENT & PLAN:   Davin Shi is a 42 y.o. male admitted for    Active Hospital Problems    Diagnosis  POA    *Acute appendicitis [K35.80]  Unknown      Resolved Hospital Problems   No resolved problems to display.        Plan  Admit to Med-surg  IV hydration  IV Zosyn  Prn analgesics and antiemetics  NPO after midnight  General surgery consult; plans for appendectomy in AM          Diet: NPO after midnight    DVT Prophylaxis: Mechanical DVT prophylaxis. Encourage ambulation and OOB as tolerated.     Discharge Planning and Disposition:  Patient will be discharged in 1-2 days  ________________________________________________________________________________    Face-to-Face encounter date: 07/21/2022  Encounter included review of the medical records, interviewing and examining the patient face-to-face, discussion with family and other health care providers including emergency medicine physician, admission orders, interpreting lab/test results and formulating a plan of care.   Medical Decision Making during this encounter was  [_] Low Complexity  [_] Moderate Complexity  [x] High Complexity  _________________________________________________________________________________    INPATIENT LIST OF MEDICATIONS     Current Facility-Administered Medications:     piperacillin-tazobactam 3.375 g in dextrose 5 % 50 mL IVPB (ready to mix system), 3.375 g, Intravenous, ED 1 Time, Adeel Hobbs MD    [START ON 7/22/2022] piperacillin-tazobactam 3.375 g in dextrose 5 % 50 mL IVPB (ready to mix system), 3.375 g, Intravenous, Q8H, Maribel Pantoja NP    potassium chloride SA CR tablet 40 mEq, 40 mEq, Oral, Once, Adeel Hobbs MD  No current outpatient  medications on file.      Scheduled Meds:   piperacillin-tazobactam (ZOSYN) IVPB  3.375 g Intravenous ED 1 Time    [START ON 7/22/2022] piperacillin-tazobactam (ZOSYN) IVPB  3.375 g Intravenous Q8H    potassium chloride  40 mEq Oral Once     Continuous Infusions:  PRN Meds:.      Maribel Pantoja  [facility] Hospitalist  07/21/2022

## 2022-07-22 NOTE — DISCHARGE SUMMARY
"[facility]  Discharge Summary  Patient Name: Davin Shi MRN: [id number]   Patient Class: OP- Observation  Length of Stay: 0   Admission Date: 7/21/2022  6:40 PM Attending Physician: Daniele Fairbanks MD   Primary Care Provider: Primary Doctor No Face-to-Face encounter date: 07/22/2022   Chief Complaint: Abdominal Pain (RT SIDED X 1 DAY), Nausea, Chills, and Fever    Date of Discharge: 7/22/2022  Discharge Disposition:Home or Self Care    Condition: Stable       Reason for Hospitalization   Acute appendicitis      Patient Active Problem List   Diagnosis    Acute appendicitis       Brief History of Present Illness    Davin Shi is a 42 y.o. male who  has no past medical history on file.. The patient presented to [facility] on 7/21/2022 with a primary complaint of Abdominal Pain (RT SIDED X 1 DAY), Nausea, Chills, and Fever  .     For the full HPI please refer to the History & Physical from this admission.    Hospital Course By Problem with Pertinent Findings     Admitted for acute appendicitis and underwent laparoscopic appendectomy without any complications. Discharge in stable condition with PO Tramadol x 2 days. Follow up with general surgery in 2 weeks      Patient was seen and examined on the date of discharge and determined to be suitable for discharge.    Physical Exam  /87   Pulse 92   Temp 98.4 °F (36.9 °C) (Oral)   Resp 16   Ht 6' 4" (1.93 m)   Wt 120.2 kg (264 lb 15.9 oz)   SpO2 (!) 92%   BMI 32.26 kg/m²   Vitals reviewed.    Constitutional: No distress.   HENT: Atraumatic.   Cardiovascular: Normal rate, regular rhythm and normal heart sounds.   Pulmonary/Chest: Effort normal. Clear to auscultation bilaterally. No wheezes.   Abdominal: Soft. Bowel sounds are normal. Exhibits no distension and no mass. No tenderness  Neurological: Alert.   Skin: Skin is warm and dry.     Following labs were Reviewed   Recent Labs   Lab 07/22/22  0624   WBC 6.80   HGB 13.5*   HCT " 40.0      CALCIUM 8.5*   ALBUMIN 3.7   PROT 6.6   *   K 3.7   CO2 24      BUN 15   CREATININE 1.2   ALKPHOS 69   *   AST 68*   BILITOT 2.0*     No results found for: POCTGLUCOSE     All labs within the past 24 hours have been reviewed    Microbiology Results (last 7 days)     ** No results found for the last 168 hours. **        CT Abdomen Pelvis With Contrast   Final Result          No results found for this or any previous visit.      Consultants and Procedures   Consultants:  Consults (From admission, onward)        Status Ordering Provider     Inpatient consult to Hospitalist  Once        Provider:  Maribel Pantoja NP    Acknowledged CARRIE PEREA     Inpatient consult to General Surgery  Once        Provider:  Davin Rosado MD    Completed CARRIE PEREA          Procedures:   Lap appendectomy     Discharge Information:   Diet:  Resume regular diet    Physical Activity:  Activity as tolerated    Instructions:  1. Take all medications as prescribed  2. Keep all follow-up appointments  3. Return to the hospital or call your primary care physicians if any worsening symptoms such as abdominal pain, fever occur.      Follow-Up Appointments:  1. Please call your primary care physician to schedule an appointment in 1 week time.       Follow-up Information     Primary Doctor No Follow up in 1 week(s).           Davin Rosado MD Follow up in 2 week(s).    Specialty: General Surgery  Contact information:  [address] [place] [address]  [address]  [facility] [id number] [phone]                           Pending laboratory work/Tests to be performed/followed by the Primary care Physician: none    The patient was discharged in the care of her parents//wife/family/caregiver, with discharge instructions were reviewed in written and verbal form. All pertinent questions were discussed and prescriptions were provided. The importance of making follow up appointments and compliance of medications  has been stressed repeatedly. The patient will follow up in 1 week or sooner as needed with the PCP, and the patient is on board with the plan. Upon discharge, patient needs to be on following medications.    Discharge Medications:     Medication List      START taking these medications    traMADoL 50 mg tablet  Commonly known as: ULTRAM  Take 1 tablet (50 mg total) by mouth every 6 (six) hours as needed for Pain.           Where to Get Your Medications      Information about where to get these medications is not yet available    Ask your nurse or doctor about these medications  · traMADoL 50 mg tablet           I spent 30 minutes preparing the discharge including reviewing records from previous encounters, preparation of discharge summary, assessing and final examination of the patient, discharge medicine reconciliation, discussing plan of care, follow up and education and prescriptions.       Daniele Fairbanks  [facility] Hospitalist  07/22/2022

## 2022-07-22 NOTE — CONSULTS
[facility]  General Surgery  Consult Note    Consults  Subjective:     Chief Complaint/Reason for Admission:  Acute appendicitis    History of Present Illness:  This is a 42-year-old male who presented with a couple of days of right lower quadrant abdominal pain.  CT imaging in the ED showed acute appendicitis, uncomplicated.    No current facility-administered medications on file prior to encounter.     No current outpatient medications on file prior to encounter.       Review of patient's allergies indicates:  No Known Allergies    No past medical history on file.  No past surgical history on file.  Family History    None       Tobacco Use    Smoking status: Not on file    Smokeless tobacco: Not on file   Substance and Sexual Activity    Alcohol use: Not on file    Drug use: Not on file    Sexual activity: Not on file     Review of Systems   Constitutional: Negative.  Negative for fatigue and fever.   HENT: Negative.    Eyes: Negative.    Respiratory: Negative.  Negative for shortness of breath.    Cardiovascular: Negative.  Negative for chest pain.   Gastrointestinal: Positive for abdominal pain.   Endocrine: Negative.    Genitourinary: Negative.    Musculoskeletal: Negative.    Skin: Negative.    Allergic/Immunologic: Negative.    Neurological: Negative.    Hematological: Negative.    Psychiatric/Behavioral: Negative.      Objective:     Vital Signs (Most Recent):  Temp: 98.7 °F (37.1 °C) (07/22/22 0756)  Pulse: 99 (07/22/22 0756)  Resp: 17 (07/22/22 0756)  BP: (!) 125/92 (nurse notified) (07/22/22 0756)  SpO2: 97 % (07/22/22 0756) Vital Signs (24h Range):  Temp:  [98 °F (36.7 °C)-99.8 °F (37.7 °C)] 98.7 °F (37.1 °C)  Pulse:  [] 99  Resp:  [16-18] 17  SpO2:  [95 %-99 %] 97 %  BP: (109-145)/(62-98) 125/92     Weight: 120.2 kg (264 lb 15.9 oz)  Body mass index is 32.26 kg/m².      Intake/Output Summary (Last 24 hours) at 7/22/2022 0908  Last data filed at 7/22/2022 0800  Gross per 24 hour    Intake 1050 ml   Output --   Net 1050 ml       Physical Exam  Constitutional:       General: He is not in acute distress.     Appearance: Normal appearance. He is not ill-appearing, toxic-appearing or diaphoretic.   HENT:      Head: Normocephalic.      Nose: Nose normal.   Eyes:      Conjunctiva/sclera: Conjunctivae normal.   Cardiovascular:      Rate and Rhythm: Normal rate and regular rhythm.   Pulmonary:      Effort: Pulmonary effort is normal.   Abdominal:      Palpations: Abdomen is soft.      Tenderness: There is abdominal tenderness.   Musculoskeletal:         General: Normal range of motion.      Cervical back: Normal range of motion.   Skin:     General: Skin is warm.   Neurological:      General: No focal deficit present.      Mental Status: He is alert.   Psychiatric:         Mood and Affect: Mood normal.         Significant Labs:  CBC:   Recent Labs   Lab 07/22/22  0624   WBC 6.80   RBC 4.32*   HGB 13.5*   HCT 40.0      MCV 93   MCH 31.3*   MCHC 33.8     CMP:   Recent Labs   Lab 07/22/22 0624   *   CALCIUM 8.5*   ALBUMIN 3.7   PROT 6.6   *   K 3.7   CO2 24      BUN 15   CREATININE 1.2   ALKPHOS 69   *   AST 68*   BILITOT 2.0*     Coagulation: No results for input(s): PT, INR, APTT in the last 48 hours.  Lactic Acid: No results for input(s): LACTATE in the last 48 hours.    Significant Diagnostics:  CT was reviewed.  Acute uncomplicated appendicitis    Assessment/Plan:     Active Diagnoses:    Diagnosis Date Noted POA    PRINCIPAL PROBLEM:  Acute appendicitis [K35.80] 07/21/2022 Unknown      Problems Resolved During this Admission:   This is a 42-year-old male with acute uncomplicated appendicitis on imaging    He has been on antibiotics  He has NPO  Plan for operative intervention.    Thank you for your consult. I will follow-up with patient. Please contact us if you have any additional questions.    Davin Rosado MD  General Surgery  [facility]

## 2022-07-22 NOTE — OP NOTE
[facility]  Surgery Department  Operative Note    SUMMARY     Date of Procedure: 7/22/2022     Procedure: Procedure(s) (LRB):  APPENDECTOMY, LAPAROSCOPIC (N/A)     Surgeon(s) and Role:     * Davin Rosado MD - Primary    Assisting Surgeon: None    Pre-Operative Diagnosis: Acute appendicitis [K35.80]    Post-Operative Diagnosis: Post-Op Diagnosis Codes:     * Acute appendicitis [K35.80]    Anesthesia: General    Operative Findings (including complications, if any):  Acute uncomplicated appendicitis    Description of Technical Procedures:  This is a 42-year-old male who presented with right lower quadrant abdominal pain and imaging consistent with acute appendicitis.  He did consent operative intervention.  He was taken to the OR, placed supine, general endotracheal anesthesia was induced, the abdomen was prepped and draped in the usual fashion, a time-out was completed.  Access to the abdomen was achieved using open Agudelo technique at the umbilicus.  The abdomen was insufflated to 15 mmHg a scope was inserted.  Two 5 mm trocars were placed in the lower midline under direct visualization.  The appendix was identified.  It was grossly inflamed.  The mesoappendix was divided using a LigaSure device.  The appendix was then divided with a blue staple load and placed in an Endo-Catch bag.  It was removed from the navel.  The right lower quadrant was inspected for hemostasis which was adequate.  5 mm trocars removed under direct visualization and insufflation was allowed to escape.  Fascia at the navel was closed with an 0 Vicryl suture.  Skin was closed with 4-0 Monocryl.  Dermabond was applied as a dressing.  Patient tolerated the entire procedure without apparent complication, was extubated in the OR, brought to recovery in stable condition.  All counts were correct.    Significant Surgical Tasks Conducted by the Assistant(s), if Applicable: n/a    Estimated Blood Loss (EBL): min           Implants: *  No implants in log *    Specimens:   Specimen (24h ago, onward)             Start     Ordered    07/22/22 1013  Specimen to Pathology - Surgery  Once        Comments: Pre-op Diagnosis: Acute appendicitis [K35.80]Procedure(s):APPENDECTOMY, LAPAROSCOPIC Number of specimens: 1Name of specimens: 1. appendix     Question:  Release to patient  Answer:  Immediate    07/22/22 1013                        Condition: Good    Disposition: PACU - hemodynamically stable.    Attestation: I was present and scrubbed for the entire procedure.

## 2022-07-22 NOTE — TRANSFER OF CARE
"Anesthesia Transfer of Care Note    Patient: Davin Shi    Procedure(s) Performed: Procedure(s) (LRB):  APPENDECTOMY, LAPAROSCOPIC (N/A)    Patient location: PACU    Anesthesia Type: general    Transport from OR: Transported from OR on room air with adequate spontaneous ventilation    Post pain: adequate analgesia    Post assessment: no apparent anesthetic complications    Post vital signs: stable    Level of consciousness: awake    Nausea/Vomiting: no nausea/vomiting    Complications: none    Transfer of care protocol was followed      Last vitals:   Visit Vitals  BP (!) 125/92   Pulse 99   Temp 37.1 °C (98.7 °F) (Oral)   Resp 17   Ht 6' 4" (1.93 m)   Wt 120.2 kg (264 lb 15.9 oz)   SpO2 97%   BMI 32.26 kg/m²     "

## 2022-07-22 NOTE — PLAN OF CARE
Patient cleared for discharge from case management standpoint.    Follow up appointment scheduled with Dr Rosado and added to AVS. Patient does not have a PCP. CM sent email to [facility] physicians network for new PCP with hospital follow up appointment. CM requested  call patient directly with PCP appointment details. AVS updated with this info.    Chart and discharge orders reviewed.  Patient discharged home with no further case management needs.         07/22/22 1456   Final Note   Assessment Type Final Discharge Note   Anticipated Discharge Disposition Home   What phone number can be called within the next 1-3 days to see how you are doing after discharge? [phone]   Hospital Resources/Appts/Education Provided Provided patient/caregiver with written discharge plan information   Post-Acute Status   Discharge Delays None known at this time

## 2022-07-22 NOTE — DISCHARGE INSTRUCTIONS
Diet:  Resume regular diet    Physical Activity:  Activity as tolerated    Instructions:  1. Take all medications as prescribed  2. Keep all follow-up appointments  3. Return to the hospital or call your primary care physicians if any worsening symptoms such as abdominal pain, fever occur.      Follow-Up Appointments:  Please call your primary care physician to schedule an appointment in 1 week time.

## 2022-07-22 NOTE — ANESTHESIA POSTPROCEDURE EVALUATION
Anesthesia Post Evaluation    Patient: Davin Shi    Procedure(s) Performed: Procedure(s) (LRB):  APPENDECTOMY, LAPAROSCOPIC (N/A)    Final Anesthesia Type: general      Patient location during evaluation: PACU  Patient participation: Yes- Able to Participate  Level of consciousness: awake and alert  Post-procedure vital signs: reviewed and stable  Pain management: adequate  Airway patency: patent    PONV status at discharge: No PONV  Anesthetic complications: no      Cardiovascular status: blood pressure returned to baseline and stable  Respiratory status: unassisted and room air  Hydration status: euvolemic  Follow-up not needed.          Vitals Value Taken Time   /67 07/22/22 1101   Temp 37.6 °C (99.7 °F) 07/22/22 1100   Pulse 87 07/22/22 1110   Resp 18 07/22/22 1110   SpO2 95 % 07/22/22 1110   Vitals shown include unvalidated device data.      No case tracking events are documented in the log.      Pain/Micky Score: Pain Rating Prior to Med Admin: 5 (7/22/2022  3:13 AM)  Micky Score: 10 (7/22/2022 11:00 AM)

## 2022-07-22 NOTE — PLAN OF CARE
Patient underwent laparoscopic appendectomy.  He may be started on clear liquids and advance as tolerated.  If patient is able to void, he is able to tolerate clear liquids, and pain is controlled on p.o. medication, okay to DC from my standpoint.  Follow-up with me in 2 weeks

## 2022-07-29 ENCOUNTER — TELEPHONE (OUTPATIENT)
Dept: SURGERY | Facility: CLINIC | Age: 42
End: 2022-07-29
Payer: COMMERCIAL

## 2022-07-29 NOTE — TELEPHONE ENCOUNTER
Spoke with patient, states he developed a red rash on his abdomen and sides the day after surgery.  He was started on steroid cream which has not helped much.   Advised that it is most likely a reaction to the  used prior to surgery.  Make sure he has washed his abdomen real well with soap and water, clean the incisions with soap and water, dry well when finished.  Continue using the steroid cream and try Benadryl by mouth.  If he develops shortness of breath or worsening symptoms he should be seen.  Patient voiced understanding  Call back as needed

## 2022-07-29 NOTE — TELEPHONE ENCOUNTER
----- Message from Andre Dubois sent at 7/29/2022  9:13 AM CDT -----  Contact: pt at [phone]  Type:  Sooner Appointment Request    Caller is requesting a sooner appointment.  Caller declined first available appointment listed below.  Caller will not accept being placed on the waitlist and is requesting a message be sent to doctor.    Name of Caller:  pt  When is the first available appointment?  8/4/22  Symptoms:  rash  Best Call Back Number:  [phone]    Additional Information:  pt is calling the office to schedule a sooner appt. He states he developed a rash after his surgery. Please call back and advise.

## 2022-08-04 ENCOUNTER — OFFICE VISIT (OUTPATIENT)
Dept: SURGERY | Facility: CLINIC | Age: 42
End: 2022-08-04
Payer: COMMERCIAL

## 2022-08-04 VITALS
TEMPERATURE: 98 F | BODY MASS INDEX: 32.27 KG/M2 | HEIGHT: 76 IN | DIASTOLIC BLOOD PRESSURE: 85 MMHG | WEIGHT: 265 LBS | SYSTOLIC BLOOD PRESSURE: 128 MMHG | HEART RATE: 76 BPM

## 2022-08-04 DIAGNOSIS — Z98.890 POST-OPERATIVE STATE: Primary | ICD-10-CM

## 2022-08-04 PROCEDURE — 3079F PR MOST RECENT DIASTOLIC BLOOD PRESSURE 80-89 MM HG: ICD-10-PCS | Mod: CPTII,S$GLB,, | Performed by: STUDENT IN AN ORGANIZED HEALTH CARE EDUCATION/TRAINING PROGRAM

## 2022-08-04 PROCEDURE — 3008F PR BODY MASS INDEX (BMI) DOCUMENTED: ICD-10-PCS | Mod: CPTII,S$GLB,, | Performed by: STUDENT IN AN ORGANIZED HEALTH CARE EDUCATION/TRAINING PROGRAM

## 2022-08-04 PROCEDURE — 99024 POSTOP FOLLOW-UP VISIT: CPT | Mod: S$GLB,,, | Performed by: STUDENT IN AN ORGANIZED HEALTH CARE EDUCATION/TRAINING PROGRAM

## 2022-08-04 PROCEDURE — 3074F PR MOST RECENT SYSTOLIC BLOOD PRESSURE < 130 MM HG: ICD-10-PCS | Mod: CPTII,S$GLB,, | Performed by: STUDENT IN AN ORGANIZED HEALTH CARE EDUCATION/TRAINING PROGRAM

## 2022-08-04 PROCEDURE — 1159F MED LIST DOCD IN RCRD: CPT | Mod: CPTII,S$GLB,, | Performed by: STUDENT IN AN ORGANIZED HEALTH CARE EDUCATION/TRAINING PROGRAM

## 2022-08-04 PROCEDURE — 3074F SYST BP LT 130 MM HG: CPT | Mod: CPTII,S$GLB,, | Performed by: STUDENT IN AN ORGANIZED HEALTH CARE EDUCATION/TRAINING PROGRAM

## 2022-08-04 PROCEDURE — 1159F PR MEDICATION LIST DOCUMENTED IN MEDICAL RECORD: ICD-10-PCS | Mod: CPTII,S$GLB,, | Performed by: STUDENT IN AN ORGANIZED HEALTH CARE EDUCATION/TRAINING PROGRAM

## 2022-08-04 PROCEDURE — 3008F BODY MASS INDEX DOCD: CPT | Mod: CPTII,S$GLB,, | Performed by: STUDENT IN AN ORGANIZED HEALTH CARE EDUCATION/TRAINING PROGRAM

## 2022-08-04 PROCEDURE — 3079F DIAST BP 80-89 MM HG: CPT | Mod: CPTII,S$GLB,, | Performed by: STUDENT IN AN ORGANIZED HEALTH CARE EDUCATION/TRAINING PROGRAM

## 2022-08-04 PROCEDURE — 99024 PR POST-OP FOLLOW-UP VISIT: ICD-10-PCS | Mod: S$GLB,,, | Performed by: STUDENT IN AN ORGANIZED HEALTH CARE EDUCATION/TRAINING PROGRAM

## 2022-08-04 NOTE — PROGRESS NOTES
Post op note    Patient underwent appendectomy.  He is doing well.  Postoperatively, he had some cellulitic appearing changes around his umbilical incision as well as a superficial skin rash and a reaction to the drapes.  Patient took a Medrol Dosepak and is 10 very well.  No current symptoms.    Incisions are healing well  No erythema or other persistent rash    Pathology:  Appendicitis    Overall doing well.  Unclear why he had a reaction to either the drapes of the prep.  Follow-up with me as needed.

## 2024-09-26 ENCOUNTER — CLINICAL SUPPORT (OUTPATIENT)
Dept: OTHER | Facility: CLINIC | Age: 44
End: 2024-09-26
Payer: COMMERCIAL

## 2024-09-26 DIAGNOSIS — Z00.8 ENCOUNTER FOR OTHER GENERAL EXAMINATION: ICD-10-CM

## 2024-09-26 PROCEDURE — 99401 PREV MED CNSL INDIV APPRX 15: CPT | Mod: S$GLB,,, | Performed by: INTERNAL MEDICINE

## 2024-09-26 PROCEDURE — 82947 ASSAY GLUCOSE BLOOD QUANT: CPT | Mod: QW,S$GLB,, | Performed by: INTERNAL MEDICINE

## 2024-09-26 PROCEDURE — 80061 LIPID PANEL: CPT | Mod: QW,S$GLB,, | Performed by: INTERNAL MEDICINE

## 2024-09-27 VITALS
DIASTOLIC BLOOD PRESSURE: 92 MMHG | WEIGHT: 270 LBS | HEIGHT: 76 IN | BODY MASS INDEX: 32.88 KG/M2 | SYSTOLIC BLOOD PRESSURE: 152 MMHG

## 2024-09-27 LAB
GLUCOSE SERPL-MCNC: 88 MG/DL (ref 60–140)
HDLC SERPL-MCNC: 45 MG/DL
POC CHOLESTEROL, LDL (DOCK): 222 MG/DL
POC CHOLESTEROL, TOTAL: 320 MG/DL
TRIGL SERPL-MCNC: 259 MG/DL

## 2024-10-09 ENCOUNTER — OFFICE VISIT (OUTPATIENT)
Dept: FAMILY MEDICINE | Facility: CLINIC | Age: 44
End: 2024-10-09
Payer: COMMERCIAL

## 2024-10-09 VITALS
DIASTOLIC BLOOD PRESSURE: 80 MMHG | SYSTOLIC BLOOD PRESSURE: 128 MMHG | WEIGHT: 269 LBS | HEIGHT: 76 IN | TEMPERATURE: 98 F | OXYGEN SATURATION: 99 % | BODY MASS INDEX: 32.76 KG/M2 | HEART RATE: 78 BPM

## 2024-10-09 DIAGNOSIS — Z00.00 ANNUAL PHYSICAL EXAM: Primary | ICD-10-CM

## 2024-10-09 DIAGNOSIS — Z11.59 ENCOUNTER FOR HEPATITIS C SCREENING TEST FOR LOW RISK PATIENT: ICD-10-CM

## 2024-10-09 DIAGNOSIS — Z13.1 DIABETES MELLITUS SCREENING: ICD-10-CM

## 2024-10-09 DIAGNOSIS — R03.0 ELEVATED BLOOD PRESSURE READING: ICD-10-CM

## 2024-10-09 DIAGNOSIS — Z13.220 LIPID SCREENING: ICD-10-CM

## 2024-10-09 DIAGNOSIS — Z11.4 ENCOUNTER FOR SCREENING FOR HIV: ICD-10-CM

## 2024-10-09 DIAGNOSIS — Z13.0 SCREENING FOR DEFICIENCY ANEMIA: ICD-10-CM

## 2024-10-09 PROCEDURE — 1160F RVW MEDS BY RX/DR IN RCRD: CPT | Mod: CPTII,S$GLB,, | Performed by: NURSE PRACTITIONER

## 2024-10-09 PROCEDURE — 99999 PR PBB SHADOW E&M-EST. PATIENT-LVL III: CPT | Mod: PBBFAC,,, | Performed by: NURSE PRACTITIONER

## 2024-10-09 PROCEDURE — 3079F DIAST BP 80-89 MM HG: CPT | Mod: CPTII,S$GLB,, | Performed by: NURSE PRACTITIONER

## 2024-10-09 PROCEDURE — 3008F BODY MASS INDEX DOCD: CPT | Mod: CPTII,S$GLB,, | Performed by: NURSE PRACTITIONER

## 2024-10-09 PROCEDURE — 99386 PREV VISIT NEW AGE 40-64: CPT | Mod: S$GLB,,, | Performed by: NURSE PRACTITIONER

## 2024-10-09 PROCEDURE — 1159F MED LIST DOCD IN RCRD: CPT | Mod: CPTII,S$GLB,, | Performed by: NURSE PRACTITIONER

## 2024-10-09 PROCEDURE — 3074F SYST BP LT 130 MM HG: CPT | Mod: CPTII,S$GLB,, | Performed by: NURSE PRACTITIONER

## 2024-10-09 NOTE — PROGRESS NOTES
HPI: Davni Shi  is a 44 y.o. male who presents for annual physical .  No complaints at this time.     Review of Systems   Constitutional:  Negative for appetite change, chills, diaphoresis, fatigue, fever and unexpected weight change.   HENT:  Negative for congestion, ear discharge, ear pain, hearing loss, sore throat, trouble swallowing and voice change.    Eyes:  Negative for photophobia, pain and visual disturbance.   Respiratory:  Negative for cough, chest tightness and shortness of breath.    Cardiovascular:  Negative for chest pain, palpitations and leg swelling.   Gastrointestinal:  Negative for abdominal pain, constipation, diarrhea, nausea and vomiting.   Endocrine: Negative for cold intolerance and heat intolerance.   Genitourinary:  Negative for difficulty urinating, dysuria and flank pain.   Musculoskeletal:  Negative for arthralgias, gait problem and myalgias.   Skin:  Negative for rash.   Allergic/Immunologic: Negative for immunocompromised state.   Neurological:  Negative for dizziness, weakness and headaches.   Hematological:  Negative for adenopathy.   Psychiatric/Behavioral:  Negative for agitation, confusion, self-injury and suicidal ideas.      Review of patient's allergies indicates:   Allergen Reactions    Adhesive Rash     History reviewed. No pertinent past medical history.  Past Surgical History:   Procedure Laterality Date    APPENDECTOMY  07/22/2022    Dr Rosado    LAPAROSCOPIC APPENDECTOMY N/A 7/22/2022    Procedure: APPENDECTOMY, LAPAROSCOPIC;  Surgeon: Davin Rosado MD;  Location: [facility];  Service: General;  Laterality: N/A;     Family History   Adopted: Yes     Social History     Tobacco Use    Smoking status: Never    Smokeless tobacco: Never   Substance Use Topics    Alcohol use: Yes     Comment: weekends    Drug use: Not Currently      Health Maintenance Topics with due status: Not Due       Topic Last Completion Date    Lipid Panel 09/26/2024    RSV Vaccine (Age 60+ and  Pregnant patients) Not Due     Immunization History   Administered Date(s) Administered    COVID-19, MRNA, LN-S, PF (MODERNA FULL 0.5 ML DOSE) 02/04/2021, 03/04/2021     OBJECTIVE:      Vitals:    10/09/24 0814   BP: 128/80   Pulse: 78   Temp: 98.4 °F (36.9 °C)     Physical Exam  Vitals and nursing note reviewed.   Constitutional:       General: He is not in acute distress.     Appearance: Normal appearance. He is well-developed. He is not ill-appearing.   HENT:      Head: Normocephalic and atraumatic.      Right Ear: External ear normal.      Left Ear: External ear normal.      Nose: Nose normal.      Mouth/Throat:      Mouth: Mucous membranes are moist.      Pharynx: Oropharynx is clear. Uvula midline.   Eyes:      General: Lids are normal.      Extraocular Movements: Extraocular movements intact.      Conjunctiva/sclera: Conjunctivae normal.      Pupils: Pupils are equal, round, and reactive to light.   Cardiovascular:      Rate and Rhythm: Normal rate and regular rhythm.      Pulses: Normal pulses.      Heart sounds: Normal heart sounds, S1 normal and S2 normal. No murmur heard.  Pulmonary:      Effort: Pulmonary effort is normal. No respiratory distress.      Breath sounds: Normal breath sounds.   Abdominal:      General: Bowel sounds are normal.      Palpations: Abdomen is soft.      Tenderness: There is no abdominal tenderness.   Musculoskeletal:         General: Normal range of motion.      Cervical back: Normal range of motion and neck supple.   Lymphadenopathy:      Cervical: No cervical adenopathy.   Skin:     General: Skin is warm and dry.      Findings: No rash.   Neurological:      Mental Status: He is alert and oriented to person, place, and time.   Psychiatric:         Mood and Affect: Mood normal.         Speech: Speech normal.         Behavior: Behavior normal.         Thought Content: Thought content normal.         Judgment: Judgment normal.        Assessment:       1. Annual physical exam    2.  Diabetes mellitus screening    3. Lipid screening    4. Screening for deficiency anemia    5. Encounter for hepatitis C screening test for low risk patient    6. Encounter for screening for HIV    7. Elevated blood pressure reading    8. BMI 32.0-32.9,adult        Plan:       Annual physical exam  Completed    Diabetes mellitus screening  -     Hemoglobin A1C; Future; Expected date: 10/09/2024  -     Comprehensive Metabolic Panel; Future; Expected date: 10/09/2024    Lipid screening  -     Lipid Panel; Future; Expected date: 10/09/2024    Screening for deficiency anemia  -     CBC Auto Differential; Future; Expected date: 10/09/2024    Encounter for hepatitis C screening test for low risk patient  -     Hepatitis C Antibody; Future; Expected date: 10/09/2024    Encounter for screening for HIV  -     HIV 1/2 Ag/Ab (4th Gen); Future; Expected date: 10/09/2024    Elevated blood pressure reading  -     TSH; Future; Expected date: 10/09/2024    BMI 32.0-32.9,adult  The patient is asked to make an attempt to improve diet and exercise patterns to aid in medical management of this problem.        Patient counseled on age appropriate medical preventative services, age appropriate cancer screenings, nutrition, healthy diet, consistent exercise regimen and maintaining an active lifestyle.      Counseled on age appropriate vaccines and discussed upcoming health care needs based on age/gender.  Spent time with patient counseling on need for a good patient/doctor relationship moving forward.  Discussed use of common OTC medications and supplements.  Discussed common dietary aids and use of caffeine and the need for good sleep hygiene and stress management.    Follow up in about 4 weeks (around 11/6/2024) for lab review, hyperlipidemia.      10/9/2024 NICOL Coburn, FNP-C

## 2024-11-06 ENCOUNTER — PATIENT MESSAGE (OUTPATIENT)
Dept: FAMILY MEDICINE | Facility: CLINIC | Age: 44
End: 2024-11-06
Payer: COMMERCIAL

## 2024-11-13 ENCOUNTER — PATIENT MESSAGE (OUTPATIENT)
Dept: RESEARCH | Facility: HOSPITAL | Age: 44
End: 2024-11-13
Payer: COMMERCIAL

## 2024-12-17 ENCOUNTER — PATIENT MESSAGE (OUTPATIENT)
Dept: FAMILY MEDICINE | Facility: CLINIC | Age: 44
End: 2024-12-17
Payer: COMMERCIAL

## 2025-01-28 ENCOUNTER — PATIENT MESSAGE (OUTPATIENT)
Dept: FAMILY MEDICINE | Facility: CLINIC | Age: 45
End: 2025-01-28
Payer: COMMERCIAL

## 2025-03-05 ENCOUNTER — PATIENT MESSAGE (OUTPATIENT)
Dept: FAMILY MEDICINE | Facility: CLINIC | Age: 45
End: 2025-03-05
Payer: COMMERCIAL

## 2025-03-19 ENCOUNTER — OCCUPATIONAL HEALTH (OUTPATIENT)
Dept: URGENT CARE | Facility: CLINIC | Age: 45
End: 2025-03-19

## 2025-03-19 PROCEDURE — 80305 DRUG TEST PRSMV DIR OPT OBS: CPT | Mod: S$GLB,,, | Performed by: EMERGENCY MEDICINE

## 2025-04-08 ENCOUNTER — TELEPHONE (OUTPATIENT)
Dept: FAMILY MEDICINE | Facility: CLINIC | Age: 45
End: 2025-04-08
Payer: COMMERCIAL

## 2025-04-08 DIAGNOSIS — Z13.0 SCREENING FOR DEFICIENCY ANEMIA: ICD-10-CM

## 2025-04-08 DIAGNOSIS — Z13.220 LIPID SCREENING: ICD-10-CM

## 2025-04-08 DIAGNOSIS — Z13.1 DIABETES MELLITUS SCREENING: ICD-10-CM

## 2025-04-08 DIAGNOSIS — R03.0 ELEVATED BLOOD PRESSURE READING: ICD-10-CM

## 2025-04-08 DIAGNOSIS — Z11.59 ENCOUNTER FOR HEPATITIS C SCREENING TEST FOR LOW RISK PATIENT: ICD-10-CM

## 2025-04-08 DIAGNOSIS — Z11.4 ENCOUNTER FOR SCREENING FOR HIV: ICD-10-CM

## 2025-04-10 ENCOUNTER — PATIENT MESSAGE (OUTPATIENT)
Dept: FAMILY MEDICINE | Facility: CLINIC | Age: 45
End: 2025-04-10
Payer: COMMERCIAL

## 2025-07-08 ENCOUNTER — OFFICE VISIT (OUTPATIENT)
Dept: OPHTHALMOLOGY | Facility: CLINIC | Age: 45
End: 2025-07-08
Payer: COMMERCIAL

## 2025-07-08 DIAGNOSIS — Q14.0: ICD-10-CM

## 2025-07-08 DIAGNOSIS — H04.123 DRY EYE SYNDROME, BILATERAL: Primary | ICD-10-CM

## 2025-07-08 DIAGNOSIS — H18.523 ANTERIOR BASEMENT MEMBRANE DYSTROPHY OF BOTH EYES: ICD-10-CM

## 2025-07-08 DIAGNOSIS — H02.88B MEIBOMIAN GLAND DYSFUNCTION (MGD), BILATERAL, BOTH UPPER AND LOWER LIDS: ICD-10-CM

## 2025-07-08 DIAGNOSIS — H02.88A MEIBOMIAN GLAND DYSFUNCTION (MGD), BILATERAL, BOTH UPPER AND LOWER LIDS: ICD-10-CM

## 2025-07-08 PROCEDURE — 99999 PR PBB SHADOW E&M-EST. PATIENT-LVL II: CPT | Mod: PBBFAC,,, | Performed by: OPHTHALMOLOGY

## 2025-07-08 PROCEDURE — 1160F RVW MEDS BY RX/DR IN RCRD: CPT | Mod: CPTII,S$GLB,, | Performed by: OPHTHALMOLOGY

## 2025-07-08 PROCEDURE — 1159F MED LIST DOCD IN RCRD: CPT | Mod: CPTII,S$GLB,, | Performed by: OPHTHALMOLOGY

## 2025-07-08 PROCEDURE — 92004 COMPRE OPH EXAM NEW PT 1/>: CPT | Mod: S$GLB,,, | Performed by: OPHTHALMOLOGY

## 2025-07-08 NOTE — PATIENT INSTRUCTIONS
"What is dry eye?   -- Dry eye happens when your eyes either do not make enough tears or the tears that they make evaporate (go away) too quickly. This causes your eyes to feel dry and irritated. The medical term for dry eye is "keratoconjunctivitis sicca."    What causes dry eye?  -- Many people have dry eye, including about one-third of older adults. A few people with dry eye might have another disease, such as Sjögren's syndrome, diabetes, or Parkinson disease. Some medicines can cause dry eye symptoms or make them worse.    What are the symptoms of dry eye?   -- People have different symptoms but the most common ones are eyes that:  ?Feel dry or burn  ?Look red  Other symptoms can include:  ?Being bothered by light  ?Feeling like something is in your eyes  ?Blurry vision  ?Watery eyes  ?Discomfort wearing contact lenses  In some people the symptoms are worse when it is cold or windy or if they are in a dry environment.    How is dry eye treated? -- Artificial tears or lubricant eye drops are the main treatment for dry eye. They can keep the eye moist and help with the symptoms of dry eye. You can buy artificial tears at the drug store or grocery store without a prescription. They come in liquid, gel, or ointments. Your doctor or nurse will help you decide which form is best for you. It is usually best to avoid eye drops that are meant to reduce redness.    Artificial tears help with the symptoms of dry eye, but they do not cure the condition. They work only as long as you keep using them.  Other things you can do to help improve your symptoms are:  ?Try to blink a lot, especially when you are reading or using the computer. This helps keep your eye moist.  ?Avoid excess air conditioning or heating as much as you can. Also avoid sitting directly in the flow of the cold or hot air.  ?Use a humidifier in your bedroom and any other space where you spend a lot of time.  ?Avoid smoke and smoky air  ?Wear protective " [facility]    Phone Message    May a detailed message be left on voicemail: no     Reason for Call: Other: pt called to let Willy RN know that he did get another appointment elsewhere for tomorrow and does not need a call back to be schedule with [facility]      Action Taken: Message routed to:  [facility] ([facility] eye    Travel Screening: Not Applicable     Date of Service:                                                                      eyewear when you are outside. Glasses that cover more of your face, or have special shields on the sides, can protect your eyes from wind and dry air.    If your dry eye does not get better, your doctor can do more tests and might suggest other treatments. These include prescription eye drops or ointments, special glasses or goggles, oral medicines (pills), or minor surgical procedures.     What is blepharitis?   -- Blepharitis is inflammation of the eyelids that causes redness and swelling of the lids. The symptoms might get better and then come back. Blepharitis can affect your tear film and lead to dry eyes.    Blepharitis is more common in people who have certain skin conditions, including:  ?Rosacea - This causes redness and raised, red bumps on the cheeks, nose, chin, forehead, or eyelids.  ?Seborrhea - This causes redness, scaly patches, and itching, mostly on the scalp. Dandruff is a mild form of seborrhea.    What are the symptoms of blepharitis? -- The symptoms include:  ?Eyelids that are red, swollen, and itchy  ?A gritty or burning feeling in the eyes  ?Red eyes  ?Crusty, matted eyelashes in the morning  ?Flaking or scaling of the eyelid skin    Is there anything I can do on my own to feel better? -- Yes. You can:  ?Put warm, wet pressure on your eyes - Wet a clean wash cloth with warm (not scalding hot) water and put it over your eyes. When the wash cloth cools, reheat it with warm water and put it back over your eyes. Repeat these steps for 5 minutes, 2 to 4 times a day.  ?Gently rub your eyelids - Do this right after putting warm, wet pressure on your eyes. Use the washcloth or a clean fingertip to gently rub your eyelid in small circles.  ?Wash your eyelids - Use plain warm water or warm water with a drop of baby shampoo on a clean washcloth, gauze pad, or cotton swab. Gently clean any crusty material off the eyelashes and eyelids. Do not rub hard or you can cause more irritation. You can also use  over-the-counter eyelid scrubs and pads.    How is blepharitis treated? -- If the treatments you do on your own do not help, your doctor might prescribe:  ?An antibiotic cream or ointment to put on your eyelids  ?Antibiotic pills

## 2025-07-08 NOTE — PROGRESS NOTES
HPI    New pt here c/o feels like something in OD.    Pt states over 5mo ago OD started feeling like something in it also was   very sensitive. Pt states since has improved some.    AT's BID   Last edited by Stacey Park on 7/8/2025  1:09 PM.            Assessment /Plan     For exam results, see Encounter Report.    Dry eye syndrome, bilateral    Meibomian gland dysfunction (MGD), bilateral, both upper and lower lids    Anterior basement membrane dystrophy of both eyes    Bergmeister papilla      1. Dry eye syndrome, bilateral (Primary)  Pt reassured eyes are healthy  I think combination of GALI/ABMD causing symptoms  Recommend ATS QID OU    2. Meibomian gland dysfunction (MGD), bilateral, both upper and lower lids  Mild dz  Recommend warm compresses regularly    3. Anterior basement membrane dystrophy of both eyes  Reviewed dx with patient  Lubricant drops as above    4. Bergmeister papilla  OD, incidental finding, observe    F/u PRN

## (undated) DEVICE — UNDERGLOVE BIOGEL PI MICRO BLUE SZ 7.5

## (undated) DEVICE — SUTURE VICRYL #0 27 UR-6 VCP603H

## (undated) DEVICE — SOLUTION IRRI H2O BOTTLE 1000ML

## (undated) DEVICE — TRAY GENERAL LAPAROSCOPY

## (undated) DEVICE — SUTURE MONOCRYL 4-0 PS-2 27 MCP426H

## (undated) DEVICE — SLEEVE TROCAR 5MMX100MM  CTS02

## (undated) DEVICE — TROCAR OPTICAL ZTHREAD 5MMX100MM CTF03

## (undated) DEVICE — SOLUTION IRRI NS BOTTLE 1000ML R5200-01

## (undated) DEVICE — TROCAR BALL. BLNT HASSON C0R47

## (undated) DEVICE — Device

## (undated) DEVICE — LIGASURE MARYLAND LF1937 REPLACES LF1737

## (undated) DEVICE — DERMABOND HVD MINI  DHVM12

## (undated) DEVICE — PAD BOVIE ADULT

## (undated) DEVICE — RETRIEVER ENDOPOUCH SPEC BAG